# Patient Record
Sex: FEMALE | Race: WHITE | Employment: FULL TIME | URBAN - METROPOLITAN AREA
[De-identification: names, ages, dates, MRNs, and addresses within clinical notes are randomized per-mention and may not be internally consistent; named-entity substitution may affect disease eponyms.]

---

## 2018-01-15 ENCOUNTER — LAB ENCOUNTER (OUTPATIENT)
Dept: LAB | Facility: HOSPITAL | Age: 23
End: 2018-01-15
Attending: FAMILY MEDICINE
Payer: COMMERCIAL

## 2018-01-15 ENCOUNTER — OFFICE VISIT (OUTPATIENT)
Dept: FAMILY MEDICINE CLINIC | Facility: CLINIC | Age: 23
End: 2018-01-15

## 2018-01-15 VITALS
HEART RATE: 74 BPM | WEIGHT: 140 LBS | HEIGHT: 64 IN | OXYGEN SATURATION: 98 % | TEMPERATURE: 98 F | SYSTOLIC BLOOD PRESSURE: 118 MMHG | RESPIRATION RATE: 16 BRPM | BODY MASS INDEX: 23.9 KG/M2 | DIASTOLIC BLOOD PRESSURE: 76 MMHG

## 2018-01-15 DIAGNOSIS — R10.33 PERIUMBILICAL ABDOMINAL PAIN: ICD-10-CM

## 2018-01-15 DIAGNOSIS — Z00.00 LABORATORY EXAM ORDERED AS PART OF ROUTINE GENERAL MEDICAL EXAMINATION: ICD-10-CM

## 2018-01-15 DIAGNOSIS — R19.5 LOOSE STOOLS: ICD-10-CM

## 2018-01-15 DIAGNOSIS — R10.33 PERIUMBILICAL ABDOMINAL PAIN: Primary | ICD-10-CM

## 2018-01-15 DIAGNOSIS — J30.9 ALLERGIC RHINITIS, UNSPECIFIED SEASONALITY, UNSPECIFIED TRIGGER: ICD-10-CM

## 2018-01-15 LAB
ALBUMIN SERPL BCP-MCNC: 4.2 G/DL (ref 3.5–4.8)
ALBUMIN/GLOB SERPL: 1.5 {RATIO} (ref 1–2)
ALP SERPL-CCNC: 52 U/L (ref 32–100)
ALT SERPL-CCNC: 21 U/L (ref 14–54)
ANION GAP SERPL CALC-SCNC: 7 MMOL/L (ref 0–18)
AST SERPL-CCNC: 22 U/L (ref 15–41)
BASOPHILS # BLD: 0 K/UL (ref 0–0.2)
BASOPHILS NFR BLD: 0 %
BILIRUB SERPL-MCNC: 0.7 MG/DL (ref 0.3–1.2)
BUN SERPL-MCNC: 7 MG/DL (ref 8–20)
BUN/CREAT SERPL: 7.1 (ref 10–20)
CALCIUM SERPL-MCNC: 9.3 MG/DL (ref 8.5–10.5)
CHLORIDE SERPL-SCNC: 106 MMOL/L (ref 95–110)
CO2 SERPL-SCNC: 27 MMOL/L (ref 22–32)
CREAT SERPL-MCNC: 0.98 MG/DL (ref 0.5–1.5)
CRP SERPL HS-MCNC: 3.6 MG/L (ref 0–7.5)
EOSINOPHIL # BLD: 0.3 K/UL (ref 0–0.7)
EOSINOPHIL NFR BLD: 5 %
ERYTHROCYTE [DISTWIDTH] IN BLOOD BY AUTOMATED COUNT: 13.5 % (ref 11–15)
ERYTHROCYTE [SEDIMENTATION RATE] IN BLOOD: 8 MM/HR (ref 0–20)
GLOBULIN PLAS-MCNC: 2.8 G/DL (ref 2.5–3.7)
GLUCOSE SERPL-MCNC: 91 MG/DL (ref 70–99)
HCT VFR BLD AUTO: 39.5 % (ref 35–48)
HGB BLD-MCNC: 13.5 G/DL (ref 12–16)
IGA SERPL-MCNC: 129 MG/DL (ref 68–378)
LYMPHOCYTES # BLD: 1.5 K/UL (ref 1–4)
LYMPHOCYTES NFR BLD: 27 %
MAGNESIUM SERPL-MCNC: 1.8 MG/DL (ref 1.8–2.5)
MCH RBC QN AUTO: 28.1 PG (ref 27–32)
MCHC RBC AUTO-ENTMCNC: 34.2 G/DL (ref 32–37)
MCV RBC AUTO: 82.3 FL (ref 80–100)
MONOCYTES # BLD: 0.5 K/UL (ref 0–1)
MONOCYTES NFR BLD: 8 %
NEUTROPHILS # BLD AUTO: 3.4 K/UL (ref 1.8–7.7)
NEUTROPHILS NFR BLD: 60 %
OSMOLALITY UR CALC.SUM OF ELEC: 288 MOSM/KG (ref 275–295)
PLATELET # BLD AUTO: 202 K/UL (ref 140–400)
PMV BLD AUTO: 9.4 FL (ref 7.4–10.3)
POTASSIUM SERPL-SCNC: 3.9 MMOL/L (ref 3.3–5.1)
PROT SERPL-MCNC: 7 G/DL (ref 5.9–8.4)
RBC # BLD AUTO: 4.8 M/UL (ref 3.7–5.4)
SODIUM SERPL-SCNC: 140 MMOL/L (ref 136–144)
TSH SERPL-ACNC: 1.04 UIU/ML (ref 0.45–5.33)
WBC # BLD AUTO: 5.6 K/UL (ref 4–11)

## 2018-01-15 PROCEDURE — 84443 ASSAY THYROID STIM HORMONE: CPT

## 2018-01-15 PROCEDURE — 86141 C-REACTIVE PROTEIN HS: CPT

## 2018-01-15 PROCEDURE — 85652 RBC SED RATE AUTOMATED: CPT

## 2018-01-15 PROCEDURE — 82784 ASSAY IGA/IGD/IGG/IGM EACH: CPT

## 2018-01-15 PROCEDURE — 83735 ASSAY OF MAGNESIUM: CPT

## 2018-01-15 PROCEDURE — 36415 COLL VENOUS BLD VENIPUNCTURE: CPT

## 2018-01-15 PROCEDURE — 99204 OFFICE O/P NEW MOD 45 MIN: CPT | Performed by: FAMILY MEDICINE

## 2018-01-15 PROCEDURE — 82306 VITAMIN D 25 HYDROXY: CPT

## 2018-01-15 PROCEDURE — 84207 ASSAY OF VITAMIN B-6: CPT

## 2018-01-15 PROCEDURE — 86628 CANDIDA ANTIBODY: CPT

## 2018-01-15 PROCEDURE — 85025 COMPLETE CBC W/AUTO DIFF WBC: CPT

## 2018-01-15 PROCEDURE — 86001 ALLERGEN SPECIFIC IGG: CPT

## 2018-01-15 PROCEDURE — 83516 IMMUNOASSAY NONANTIBODY: CPT

## 2018-01-15 PROCEDURE — 80050 GENERAL HEALTH PANEL: CPT

## 2018-01-15 PROCEDURE — 80053 COMPREHEN METABOLIC PANEL: CPT

## 2018-01-15 NOTE — PROGRESS NOTES
Milagros Bonds is a 20year old female.     Patient presents with:  Stomach Pain: stomach cramping / may be related to dairy, started in september, constant pain      HPI:   Referred here by Marion Bustamante  Has to poop after having dairy  Recently started h status: Never Smoker    Smokeless tobacco: Never Used    Comment: No household smokers.      Alcohol use Yes  2.4 oz/week    4 Standard drinks or equivalent per week         Drug use: No    Sexual activity: Not on file     Other Topics Concern    Caffeine C (AUTOMATED); Future  - C-RP/HIGH SENSITIVITY; Future  - CELIAC DISEASE SCREEN REFLEX; Future    2. Loose stools  - ALLERGEN, FOOD COMMON PANEL IGG; Future  - CBC WITH DIFFERENTIAL WITH PLATELET; Future  - CANDIDA IGG/A/M AB PANEL;  Future  - COMP METABOLIC Products. [facility] makes no representations or warranties of any kind, expressed or implied, as to the Products, including, but not limited to its efficacy, benefits or outcomes.   Patient agrees to contact his/her healthcare professional and stop use of Product

## 2018-01-17 LAB
25(OH)D3 SERPL-MCNC: 45.8 NG/ML
ALLERGEN ALMOND IGG: <2 MCG/ML
ALLERGEN BAKERS YEAST IGG: 2.77 MCG/ML
ALLERGEN BANANA IGG: 7.19 MCG/ML
ALLERGEN CASEIN (COW'S MILK) IGG: 4.31 MCG/ML
ALLERGEN CHICKEN IGG: <2 MCG/ML
ALLERGEN CORN IGG: 2.83 MCG/ML
ALLERGEN EGG WHITE IGG: <2 MCG/ML
ALLERGEN GARLIC IGG: 3.85 MCG/ML
ALLERGEN GLUTEN IGG: 3.46 MCG/ML
ALLERGEN OAT IGG: 4.18 MCG/ML
ALLERGEN ORANGE IGG: 2.85 MCG/ML
ALLERGEN PEANUT IGG: 2.5 MCG/ML
ALLERGEN RICE IGG: 4.88 MCG/ML
ALLERGEN SOYBEAN IGG: <2 MCG/ML
ALLERGEN TOMATO IGG: 2.5 MCG/ML
ALLERGEN WHEAT IGG: 5.84 MCG/ML
ALLERGEN WHEY IGG: 8.64 MCG/ML
ALLERGEN WHITE POTATO, IGG: <2 MCG/ML
TTG IGA SER-ACNC: 0.3 U/ML (ref ?–7)
VITAMIN B6: 27 NMOL/L

## 2018-01-18 LAB
CANDIDA ANTIBODY IGA: 1.38 EV
CANDIDA ANTIBODY IGG: 0.86 EV
CANDIDA ANTIBODY IGM: 0.85 EV

## 2018-02-19 ENCOUNTER — OFFICE VISIT (OUTPATIENT)
Dept: FAMILY MEDICINE CLINIC | Facility: CLINIC | Age: 23
End: 2018-02-19

## 2018-02-19 VITALS
HEIGHT: 64 IN | OXYGEN SATURATION: 99 % | DIASTOLIC BLOOD PRESSURE: 62 MMHG | RESPIRATION RATE: 16 BRPM | HEART RATE: 78 BPM | WEIGHT: 134 LBS | TEMPERATURE: 97 F | BODY MASS INDEX: 22.88 KG/M2 | SYSTOLIC BLOOD PRESSURE: 98 MMHG

## 2018-02-19 DIAGNOSIS — R10.33 PERIUMBILICAL ABDOMINAL PAIN: ICD-10-CM

## 2018-02-19 DIAGNOSIS — R19.5 LOOSE STOOLS: Primary | ICD-10-CM

## 2018-02-19 DIAGNOSIS — R79.0 LOW MAGNESIUM LEVEL: ICD-10-CM

## 2018-02-19 DIAGNOSIS — B37.9 CANDIDIASIS: ICD-10-CM

## 2018-02-19 DIAGNOSIS — E53.9 VITAMIN B DEFICIENCY: ICD-10-CM

## 2018-02-19 DIAGNOSIS — J30.9 ALLERGIC RHINITIS, UNSPECIFIED SEASONALITY, UNSPECIFIED TRIGGER: ICD-10-CM

## 2018-02-19 DIAGNOSIS — T78.1XXA FOOD SENSITIVITY WITH GASTROINTESTINAL SYMPTOMS: ICD-10-CM

## 2018-02-19 PROCEDURE — 99214 OFFICE O/P EST MOD 30 MIN: CPT | Performed by: FAMILY MEDICINE

## 2018-02-19 NOTE — PATIENT INSTRUCTIONS
The products and items listed below (the “Products”)  and their claims have not been evaluated by the Food and Drug Administration. Dietary products are not intended to treat, prevent, mitigate or cure disease.  Ultimately, you must draw your own conclusion Purchase the recommended products and they will be sent directly to your address.     Magnesium:   Directions: 1 tablet twice daily       Why:  Supports Cardiovascular Health  Supports Healthy Muscle Function/Healthy Nerve Conduction  Supports Bone Health 5. The ultimate goal is to use this diet framework to create a long-term lifestyle for you to follow that will support and maintain optimal health.  It is important that you feel good, enjoy life and food and don't feel like you are being imprisoned by any A liquid supplement for gut health. This is a carbon, soil-based product that helps to rebuild the tight junctions, or important connections between cells, in the intestines.  These tight junctions get compromised by daily stress, reduced immune function an Strawberries  Watermelon  GRAINS & PSEUDO-GRAINS  Amaranth  Black rice  Brown rice  Wild rice  RED MEATS  Beef  Lamb  Venison  NUTS  Nut milks  Nut butters  Walnuts  Pecans  FERMENTED FOODS  Kombucha  Kvass  DRINKS  Decaf coffee  Green tea  Vegetable juice Minimize starchy vegetables such as sweet potatoes, potatoes, yams, corn, winter squash, beets, peas, parsnips, and beans, especially in the early part of your treatment.  When you switch to a low sugar diet, there is often a temptation to eat lots of starc The best dairy products to eat are the fermented ones like yogurt and kefir. Live probiotic cultures help your gut to repopulate with good bacteria.  The live bacteria in the yogurt will crowd out the Candida yeast and restore balance to your digestive syst Stevia, erythritol, and xylitol can be used in place of sugar, but they have a much smaller effect on your blood sugar levels. You generally need only a little of these sweeteners, as they are much sweeter tasting than sugar.  These sweeteners are particula Nut milks and butters tend to be more problematic than the nuts themselves. Making your own is the best way, but if you can’t do that then make sure that you buy a reputable organic brand.   Fermented Foods  Foods like kefir and sauerkraut tend to be most b Gluten is a very common trigger for food sensitivities, and often results in symptoms like bloating, indigestion, cramping, brain fog, and fatigue. If you already have a Candida problem, eating gluten is likely to exacerbate your symptoms.  It also likely t Most dairy should be avoided except ghee, butter, kefir, and probiotic yogurt.  Dairy foods tend to contain lots of natural sugars (e.g. lactose), and they can also be difficult to digest. Many people have latent sensitivities to dairy products (especially Many condiments and salad dressings tend to contain large amounts of hidden sugars, which can exacerbate your Candida overgrowth. Products like salad dressings might be marketed as a healthy choice, when in reality they are the exact opposite.  For an alter There are healthier sweetener choices that won’t raise your blood sugar or cause long term health problems. Try sweeteners like stevia, erythritol, or xylitol instead.   Non-Alcoholic Drinks  Caffeine can cause your blood sugar to rise, but the main problem ? Whole food or raw food protein bars ([employer] and Heather 39)  ? Roasted Seaweed  ? Roasted Kale  ? Fruit- in moderation  ?  Raw cut up veggies (with hummus)

## 2018-02-20 NOTE — PROGRESS NOTES
Joya Mina is a 20year old female. Patient presents with:  Lab Results      HPI:   Here for f/u on lab results. Avoiding dairy and has already noticed some improvement in the pain in her abdomen.  Stools are also more normal  Continues to have aller Procedure Laterality Date   • Other  12/2015    Cyst removed on tailbone-\"Dr. Perez Shadow"   • Tonsillectomy  Age 3       PHYSICAL EXAM:      02/19/18  1105   BP: 98/62   BP Location: Right arm   Pulse: 78   Resp: 16   Temp: (!) 97.2 °F (36.2 °C)   TempSrc The products and items listed below (the “Products”)  and their claims have not been evaluated by the Food and Drug Administration. Dietary products are not intended to treat, prevent, mitigate or cure disease.  Ultimately, you must draw your own conclusion Purchase the recommended products and they will be sent directly to your address.     Magnesium:   Directions: 1 tablet twice daily       Why:  Supports Cardiovascular Health  Supports Healthy Muscle Function/Healthy Nerve Conduction  Supports Bone Health 5. The ultimate goal is to use this diet framework to create a long-term lifestyle for you to follow that will support and maintain optimal health.  It is important that you feel good, enjoy life and food and don't feel like you are being imprisoned by any A liquid supplement for gut health. This is a carbon, soil-based product that helps to rebuild the tight junctions, or important connections between cells, in the intestines.  These tight junctions get compromised by daily stress, reduced immune function an Strawberries  Watermelon  GRAINS & PSEUDO-GRAINS  Amaranth  Black rice  Brown rice  Wild rice  RED MEATS  Beef  Lamb  Venison  NUTS  Nut milks  Nut butters  Walnuts  Pecans  FERMENTED FOODS  Kombucha  Kvass  DRINKS  Decaf coffee  Green tea  Vegetable juice Minimize starchy vegetables such as sweet potatoes, potatoes, yams, corn, winter squash, beets, peas, parsnips, and beans, especially in the early part of your treatment.  When you switch to a low sugar diet, there is often a temptation to eat lots of starc The best dairy products to eat are the fermented ones like yogurt and kefir. Live probiotic cultures help your gut to repopulate with good bacteria.  The live bacteria in the yogurt will crowd out the Candida yeast and restore balance to your digestive syst Stevia, erythritol, and xylitol can be used in place of sugar, but they have a much smaller effect on your blood sugar levels. You generally need only a little of these sweeteners, as they are much sweeter tasting than sugar.  These sweeteners are particula Nut milks and butters tend to be more problematic than the nuts themselves. Making your own is the best way, but if you can’t do that then make sure that you buy a reputable organic brand.   Fermented Foods  Foods like kefir and sauerkraut tend to be most b Gluten is a very common trigger for food sensitivities, and often results in symptoms like bloating, indigestion, cramping, brain fog, and fatigue. If you already have a Candida problem, eating gluten is likely to exacerbate your symptoms.  It also likely t Most dairy should be avoided except ghee, butter, kefir, and probiotic yogurt.  Dairy foods tend to contain lots of natural sugars (e.g. lactose), and they can also be difficult to digest. Many people have latent sensitivities to dairy products (especially Many condiments and salad dressings tend to contain large amounts of hidden sugars, which can exacerbate your Candida overgrowth. Products like salad dressings might be marketed as a healthy choice, when in reality they are the exact opposite.  For an alter There are healthier sweetener choices that won’t raise your blood sugar or cause long term health problems. Try sweeteners like stevia, erythritol, or xylitol instead.   Non-Alcoholic Drinks  Caffeine can cause your blood sugar to rise, but the main problem ? Whole food or raw food protein bars ([employer] and Heather 39)  ? Roasted Seaweed  ? Roasted Kale  ? Fruit- in moderation  ?  Raw cut up veggies (with hummus)       Return in about 6 weeks (around 4/2/2018) for Integrative Medicine - Established (30 min

## 2018-04-09 ENCOUNTER — OFFICE VISIT (OUTPATIENT)
Dept: FAMILY MEDICINE CLINIC | Facility: CLINIC | Age: 23
End: 2018-04-09

## 2018-04-09 VITALS
HEIGHT: 64 IN | BODY MASS INDEX: 23.39 KG/M2 | WEIGHT: 137 LBS | RESPIRATION RATE: 16 BRPM | OXYGEN SATURATION: 99 % | SYSTOLIC BLOOD PRESSURE: 110 MMHG | DIASTOLIC BLOOD PRESSURE: 80 MMHG | HEART RATE: 64 BPM

## 2018-04-09 DIAGNOSIS — E53.9 VITAMIN B DEFICIENCY: ICD-10-CM

## 2018-04-09 DIAGNOSIS — R79.0 LOW MAGNESIUM LEVEL: ICD-10-CM

## 2018-04-09 DIAGNOSIS — R10.33 PERIUMBILICAL ABDOMINAL PAIN: ICD-10-CM

## 2018-04-09 DIAGNOSIS — B37.9 CANDIDIASIS: ICD-10-CM

## 2018-04-09 DIAGNOSIS — J30.9 ALLERGIC RHINITIS, UNSPECIFIED SEASONALITY, UNSPECIFIED TRIGGER: ICD-10-CM

## 2018-04-09 DIAGNOSIS — R19.5 LOOSE STOOLS: Primary | ICD-10-CM

## 2018-04-09 DIAGNOSIS — T78.1XXA FOOD SENSITIVITY WITH GASTROINTESTINAL SYMPTOMS: ICD-10-CM

## 2018-04-09 PROCEDURE — 99214 OFFICE O/P EST MOD 30 MIN: CPT | Performed by: FAMILY MEDICINE

## 2018-04-09 NOTE — PROGRESS NOTES
Gus Velasquez is a 20year old female. Patient presents with:   Follow - Up: supplements and candida diet, started Optimag, Candisol, Caprylic acid, restore - stomach is feeling a lot better      HPI:     Not having abdominal pain regularly, having it once [facility] [employer] grade [employer]           SURGICAL HISTORY:     Past Surgical History:   Procedure Laterality Date   • Other  12/2015    Cyst removed on tailbone-\"Dr. Ingrid Smith"   • Tonsillectomy  Age 3       PHYSICAL EXAM:      04/09/18  1604   BP: 110/80   B The products and items listed below (the “Products”)  and their claims have not been evaluated by the Food and Drug Administration. Dietary products are not intended to treat, prevent, mitigate or cure disease.  Ultimately, you must draw your own conclusion

## 2018-07-18 ENCOUNTER — OFFICE VISIT (OUTPATIENT)
Dept: AUDIOLOGY | Facility: CLINIC | Age: 23
End: 2018-07-18
Payer: COMMERCIAL

## 2018-07-18 ENCOUNTER — OFFICE VISIT (OUTPATIENT)
Dept: OTOLARYNGOLOGY | Facility: CLINIC | Age: 23
End: 2018-07-18
Payer: COMMERCIAL

## 2018-07-18 VITALS
WEIGHT: 135 LBS | BODY MASS INDEX: 23.05 KG/M2 | SYSTOLIC BLOOD PRESSURE: 109 MMHG | DIASTOLIC BLOOD PRESSURE: 71 MMHG | TEMPERATURE: 100 F | HEIGHT: 64 IN

## 2018-07-18 DIAGNOSIS — H92.03 OTALGIA, BILATERAL: Primary | ICD-10-CM

## 2018-07-18 DIAGNOSIS — M26.69 TMJ INFLAMMATION: Primary | ICD-10-CM

## 2018-07-18 PROCEDURE — 99212 OFFICE O/P EST SF 10 MIN: CPT | Performed by: OTOLARYNGOLOGY

## 2018-07-18 PROCEDURE — 99214 OFFICE O/P EST MOD 30 MIN: CPT | Performed by: OTOLARYNGOLOGY

## 2018-07-18 PROCEDURE — 92567 TYMPANOMETRY: CPT | Performed by: AUDIOLOGIST

## 2018-07-18 NOTE — PROGRESS NOTES
IMMITANCE TESTING    Tympanograms only per Dr Tiny Setting    Classification: Bilateral:  Type A: normal tympanogram  Ear canal volume: Right 1.4 mL, Left 1.2 mL  Peak middle ear pressure: Right 25 daP, Left: 25 daP  Static compliance: Right 1.7 mL, Left 1.0 mL

## 2018-07-18 NOTE — PROGRESS NOTES
Sadiq Phan is a 20year old female. Patient presents with:  Ear Problem: pressure in both ears, left ear is worse for 1 week       HISTORY OF PRESENT ILLNESS  7/18/2018  Patient  presents with ear pain in the bilateral ears for7 days.  has not been expose Allergy    • Amblyopia, left eye 1996   • Hyperopia 1996    LEFT EYE     Past Surgical History:  12/2015: OTHER      Comment: Cyst removed on tailbone-\"Dr. Lissette Del Toro"  Age 9: TONSILLECTOMY      REVIEW OF SYSTEMS    System Neg/Pos Details   Constitutional inflammation     - PHYSICAL THERAPY - INTERNAL    I discussed the pathophysiology of jaw joint pain and the relationship to ear pain and headaches. I reccomend avoidance of excessive chewing ie gum.   Start:  nsaids (if not medically contraindicated), heati

## 2019-03-28 ENCOUNTER — LAB REQUISITION (OUTPATIENT)
Dept: LAB | Facility: HOSPITAL | Age: 24
End: 2019-03-28
Payer: COMMERCIAL

## 2019-03-28 DIAGNOSIS — Z11.3 ENCOUNTER FOR SCREENING FOR INFECTIONS WITH PREDOMINANTLY SEXUAL MODE OF TRANSMISSION: ICD-10-CM

## 2019-03-28 DIAGNOSIS — Z12.4 ENCOUNTER FOR SCREENING FOR MALIGNANT NEOPLASM OF CERVIX: ICD-10-CM

## 2019-03-28 DIAGNOSIS — Z01.419 ENCOUNTER FOR GYNECOLOGICAL EXAMINATION WITHOUT ABNORMAL FINDING: ICD-10-CM

## 2019-03-28 PROCEDURE — 87491 CHLMYD TRACH DNA AMP PROBE: CPT | Performed by: OBSTETRICS & GYNECOLOGY

## 2019-03-28 PROCEDURE — 88175 CYTOPATH C/V AUTO FLUID REDO: CPT | Performed by: OBSTETRICS & GYNECOLOGY

## 2019-03-28 PROCEDURE — 87591 N.GONORRHOEAE DNA AMP PROB: CPT | Performed by: OBSTETRICS & GYNECOLOGY

## 2021-11-04 ENCOUNTER — LAB REQUISITION (OUTPATIENT)
Dept: LAB | Facility: HOSPITAL | Age: 26
End: 2021-11-04
Payer: COMMERCIAL

## 2021-11-04 DIAGNOSIS — Z11.3 ENCOUNTER FOR SCREENING FOR INFECTIONS WITH A PREDOMINANTLY SEXUAL MODE OF TRANSMISSION: ICD-10-CM

## 2021-11-04 PROCEDURE — 87491 CHLMYD TRACH DNA AMP PROBE: CPT | Performed by: OBSTETRICS & GYNECOLOGY

## 2021-11-04 PROCEDURE — 87591 N.GONORRHOEAE DNA AMP PROB: CPT | Performed by: OBSTETRICS & GYNECOLOGY

## 2024-10-16 SDOH — ECONOMIC STABILITY: HOUSING INSECURITY: WHAT IS YOUR LIVING SITUATION TODAY?: I HAVE A STEADY PLACE TO LIVE

## 2024-10-16 SDOH — ECONOMIC STABILITY: FOOD INSECURITY: WITHIN THE PAST 12 MONTHS, THE FOOD YOU BOUGHT JUST DIDN'T LAST AND YOU DIDN'T HAVE MONEY TO GET MORE.: NEVER TRUE

## 2024-10-16 SDOH — ECONOMIC STABILITY: TRANSPORTATION INSECURITY
IN THE PAST 12 MONTHS, HAS LACK OF RELIABLE TRANSPORTATION KEPT YOU FROM MEDICAL APPOINTMENTS, MEETINGS, WORK OR FROM GETTING THINGS NEEDED FOR DAILY LIVING?: NO

## 2024-10-16 SDOH — ECONOMIC STABILITY: HOUSING INSECURITY: DO YOU HAVE PROBLEMS WITH ANY OF THE FOLLOWING?: NONE OF THE ABOVE

## 2024-10-16 SDOH — ECONOMIC STABILITY: GENERAL: WOULD YOU LIKE HELP WITH ANY OF THE FOLLOWING NEEDS?: I DON'T WANT HELP WITH ANY OF THESE

## 2024-10-16 ASSESSMENT — SOCIAL DETERMINANTS OF HEALTH (SDOH): IN THE PAST 12 MONTHS, HAS THE ELECTRIC, GAS, OIL, OR WATER COMPANY THREATENED TO SHUT OFF SERVICE IN YOUR HOME?: NO

## 2024-10-22 ENCOUNTER — APPOINTMENT (OUTPATIENT)
Dept: OBGYN | Age: 29
End: 2024-10-22

## 2024-10-22 VITALS
OXYGEN SATURATION: 100 % | WEIGHT: 148.48 LBS | TEMPERATURE: 98.5 F | DIASTOLIC BLOOD PRESSURE: 84 MMHG | SYSTOLIC BLOOD PRESSURE: 125 MMHG | HEIGHT: 65 IN | HEART RATE: 52 BPM | BODY MASS INDEX: 24.74 KG/M2

## 2024-10-22 DIAGNOSIS — Z01.419 ENCOUNTER FOR WELL WOMAN EXAM WITH ROUTINE GYNECOLOGICAL EXAM: Primary | ICD-10-CM

## 2024-10-22 PROCEDURE — 87591 N.GONORRHOEAE DNA AMP PROB: CPT | Performed by: CLINICAL MEDICAL LABORATORY

## 2024-10-22 PROCEDURE — 87661 TRICHOMONAS VAGINALIS AMPLIF: CPT | Performed by: CLINICAL MEDICAL LABORATORY

## 2024-10-22 PROCEDURE — 99385 PREV VISIT NEW AGE 18-39: CPT | Performed by: OBSTETRICS & GYNECOLOGY

## 2024-10-22 PROCEDURE — 87491 CHLMYD TRACH DNA AMP PROBE: CPT | Performed by: CLINICAL MEDICAL LABORATORY

## 2024-10-22 RX ORDER — NORGESTIMATE AND ETHINYL ESTRADIOL 0.25-0.035
1 KIT ORAL DAILY
COMMUNITY
Start: 2024-06-21 | End: 2025-10-04

## 2024-10-22 ASSESSMENT — PATIENT HEALTH QUESTIONNAIRE - PHQ9
2. FEELING DOWN, DEPRESSED OR HOPELESS: NOT AT ALL
1. LITTLE INTEREST OR PLEASURE IN DOING THINGS: NOT AT ALL
CLINICAL INTERPRETATION OF PHQ2 SCORE: NO FURTHER SCREENING NEEDED
SUM OF ALL RESPONSES TO PHQ9 QUESTIONS 1 AND 2: 0
SUM OF ALL RESPONSES TO PHQ9 QUESTIONS 1 AND 2: 0

## 2024-10-23 LAB
C TRACH RRNA CVX QL NAA+PROBE: NEGATIVE
Lab: NORMAL
Lab: NORMAL
N GONORRHOEA RRNA CVX QL NAA+PROBE: NEGATIVE
T VAGINALIS RRNA CVX QL NAA+PROBE: NEGATIVE

## 2024-10-24 ENCOUNTER — E-ADVICE (OUTPATIENT)
Dept: OBGYN | Age: 29
End: 2024-10-24

## 2024-10-25 ENCOUNTER — TELEPHONE (OUTPATIENT)
Dept: OBGYN | Age: 29
End: 2024-10-25

## 2024-10-25 DIAGNOSIS — Z01.419 ENCOUNTER FOR WELL WOMAN EXAM WITH ROUTINE GYNECOLOGICAL EXAM: Primary | ICD-10-CM

## 2024-10-28 RX ORDER — NORETHINDRONE ACETATE AND ETHINYL ESTRADIOL 1MG-20(21)
1 KIT ORAL DAILY
Qty: 90 TABLET | Refills: 3 | Status: SHIPPED | OUTPATIENT
Start: 2024-10-28 | End: 2025-10-28

## 2024-11-03 LAB
CASE RPRT: NORMAL
CLINICAL INFO: NORMAL
CYTOLOGY CVX/VAG STUDY: NORMAL
HPV16+18+45 E6+E7MRNA CVX NAA+PROBE: NEGATIVE
Lab: NORMAL
PAP EDUCATIONAL NOTE: NORMAL
SPECIMEN ADEQUACY: NORMAL